# Patient Record
Sex: MALE | Race: WHITE
[De-identification: names, ages, dates, MRNs, and addresses within clinical notes are randomized per-mention and may not be internally consistent; named-entity substitution may affect disease eponyms.]

---

## 2018-02-05 ENCOUNTER — HOSPITAL ENCOUNTER (OUTPATIENT)
Dept: HOSPITAL 80 - FED | Age: 23
Setting detail: OBSERVATION
LOS: 1 days | Discharge: HOME | End: 2018-02-06
Attending: INTERNAL MEDICINE | Admitting: INTERNAL MEDICINE
Payer: COMMERCIAL

## 2018-02-05 DIAGNOSIS — Z83.49: ICD-10-CM

## 2018-02-05 DIAGNOSIS — B33.23: Primary | ICD-10-CM

## 2018-02-05 LAB — PLATELET # BLD: 320 10^3/UL (ref 150–400)

## 2018-02-05 PROCEDURE — 71046 X-RAY EXAM CHEST 2 VIEWS: CPT

## 2018-02-05 PROCEDURE — 93308 TTE F-UP OR LMTD: CPT

## 2018-02-05 PROCEDURE — 93005 ELECTROCARDIOGRAM TRACING: CPT

## 2018-02-05 PROCEDURE — G0378 HOSPITAL OBSERVATION PER HR: HCPCS

## 2018-02-05 RX ADMIN — KETOROLAC TROMETHAMINE SCH MG: 15 INJECTION, SOLUTION INTRAMUSCULAR; INTRAVENOUS at 11:18

## 2018-02-05 RX ADMIN — KETOROLAC TROMETHAMINE SCH MG: 15 INJECTION, SOLUTION INTRAMUSCULAR; INTRAVENOUS at 17:44

## 2018-02-05 RX ADMIN — COLCHICINE SCH MG: 0.6 CAPSULE ORAL at 12:34

## 2018-02-05 RX ADMIN — COLCHICINE SCH MG: 0.6 CAPSULE ORAL at 21:42

## 2018-02-05 NOTE — GHP
[f rep st]



                                                            HISTORY AND PHYSICAL





DATE OF ADMISSION:  02/05/2018



CHIEF COMPLAINT:  Chest pain.



HISTORY:  The patient is a 23-year-old male, who awoke at 2:00 this morning with severe chest pain.  
He describes it as a central pleuritic sharp chest pain 8/10 in severity at its worst.  Currently 5/1
0 after receiving Toradol.  It gets very severe when he lies on his back and does get better when he 
sits upright leaning forward.  He recently had a sinusitis infection and does describe frequent bebo
nued upper respiratory viral infections; it is not unusual for him.  He also has frequent cold sweats
 at night which are somewhat chronic.  He has recently lost over 40 pounds, but he attributes it to a
 healthy [place] lifestyle since he started college here.



PAST MEDICAL HISTORY:  Negative.



MEDICATIONS:  None.



ALLERGIES:  No known drug allergies.



SOCIAL HISTORY:  No smoking.  Drinks alcohol once a week.  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
 house with 3 roommates, originally from [place] where his parents still live.



REVIEW OF SYSTEMS:  Complete review of systems obtained.  Review of systems negative regarding consti
tutional, HEENT, GI, pulmonary vascular , hematology, skin, musculoskeletal, endocrine, psych excep
t for positives and negatives as noted in HPI.



FAMILY HISTORY:  Mom has hypothyroidism.



PHYSICAL EXAMINATION:  GENERAL:  Well-developed, well-nourished male, in no acute distress.  VITAL SI
GNS:  Temperature 36.5, pulse 91, blood pressure 114/70, saturating 99% on room air.  EYES:  Normal c
onjunctivae.  Pupils equal and react to light.  ENT:  Normal ears, nose.  Hearing intact.  Normal shania
th.  Oropharynx moist.  NECK:  Trachea midline.  No thyromegaly.  CHEST:  Normal respiratory effort. 
 LUNGS:  Clear to auscultation bilaterally.  CARDIOVASCULAR:  Regular rhythm.  No murmur.  No extremi
ty edema.  ABDOMEN:  Soft, nontender.  No hepatosplenomegaly.  SKIN:  Warm, dry, intact.  No rash.  M
USCULOSKELETAL:  No cyanosis or clubbing.  Strength 5/5 upper and lower extremities.  NEUROLOGIC:  Cr
anial nerves intact.  Normal sensation to light touch.  PSYCH:  Alert and oriented x3.  Normal affect
.  Normal judgment.  Normal memory.



LABORATORY DATA:  White count 20.23, hematocrit 43.7, platelets 320.  Sodium 142, potassium 4.3, chlo
ride 100, bicarb 21, BUN 15, creatinine 0.9, glucose 110.  Troponins negative. D-dimer is negative.  
Chest x-ray is negative.  EKG viewed by me.  My personal interpretation is normal sinus rhythm.  He h
as diffuse ST elevations throughout all leads. 



This case was discussed with both Dr. Lindquist in the emergency room as well as FRANCOIS Cruz for Ca
rdiology regarding acute pericarditis diagnosis and plan of care.



ASSESSMENT/PLAN:  

1.  Acute pericarditis.  I suspect this is viral.  Will also check an HIV.  He will be treated with N
SAIDs and colchicine.  Will follow serial EKGs and repeat echo as needed. 

2.  Leukocytosis.  I suspect this is inflammatory due to his pericarditis.  Will recheck in the [facility].

3.  40 pound weight loss.  Will check a TSH, although this may be due to lifestyle alterations.



CODE STATUS:  Full.



ADMISSION STATUS:  

1.  Will admit to observation.  We will re-evaluate tomorrow regarding ongoing need for hospitalizati
on.

2.  DVT prophylaxis.  He is low risk.  I do not think the risk of converting to a hemorrhagic pericar
ditis is worth the benefit of a pharmacologic DVT prophylaxis.





Job #:  [id number]

## 2018-02-05 NOTE — CPEKG
Heart Rate: 75

RR Interval: 800

P-R Interval: 144

QRSD Interval: 74

QT Interval: 376

QTC Interval: 420

P Axis: 70

QRS Axis: 75

T Wave Axis: 74

EKG Severity - NORMAL ECG -

EKG Impression: SINUS RHYTHM

EKG Impression: ST ELEV, PROBABLE NORMAL EARLY REPOL PATTERN

Electronically Signed By: Janice Ortiz 06-Feb-2018 06:03:19

## 2018-02-05 NOTE — GCON
[f rep st]



                                                                    CONSULTATION





CARDIOLOGY CONSULTATION.



DATE OF CONSULTATION:  02/05/2018





CHIEF COMPLAINT:  Chest pain.



HISTORY OF PRESENT ILLNESS:  The patient is a 23-year-old male who presented to the hospital with kwaku
den onset of chest pain which woke him from his sleep at 2 a.m.  His discomfort has been progressive 
since that time.  It is worse with lying down and when he is up moving around.  His initial EKG was u
nremarkable.  His followup EKG does suggest acute pericarditis.  He admits to a sinus infection appro
ximately 2 months ago.  His troponins have been negative x3.  An echocardiogram revealed preserved LV
 function with EF of 65% to 70%.  There is a trivial pericardial effusion.



PAST MEDICAL HISTORY:  None.



PAST SURGICAL HISTORY:  None.



FAMILY HISTORY:  Negative for coronary artery disease.



SOCIAL HISTORY:  He is currently a student at .  He denies any drug use.  He does smoke occasionall
y, but denies any recent tobacco use.  He is exercising regularly, hiking with his dogs without chest
 discomfort.



HOME MEDICATIONS:  None.



ALLERGIES:  No known drug allergies.



REVIEW OF SYSTEMS:  Negative except for what is stated in the H and P.



PHYSICAL EXAMINATION:  GENERAL:  Patient appears in no acute distress. 

VITAL SIGNS:  Blood pressure 131/71, heart rate 92, oxygen saturation of 96% on room air, afebrile. 

NECK:  No carotid bruits or JVD present. 

LUNGS:  Clear to auscultation.  No wheezes, rhonchi, or crackles auscultated.  CARDIAC:  Regular rate
 and rhythm with mild rub present.  

ABDOMEN:  Soft, nontender, nondistended. 

EXTREMITIES:  Palpable pulses bilaterally without any evidence of edema.  NEUROLOGIC:  Nonfocal. 

PSYCHIATRIC:  Mood and affect appropriate. 

SKIN:  No obvious rashes or ecchymosis identified.



LABORATORY DATA:  BMP within normal limits except for carbon dioxide is slightly low at 21.  Troponin
 negative x3.  D-dimer negative.  WBCs 20.23, hemoglobin 15.5, hematocrit 43.7, platelets 312.



DIAGNOSTIC TESTS:  Echocardiogram reveals preserved LV function with ejection fraction of 65% to 70%.
  There is a trivial pericardial effusion.  His EKG shows diffuse ST elevation with ST depression in 
AVR suggesting pericarditis.



ASSESSMENT:  The patient is a 23-year-old male who presents with acute pericarditis.



PLAN:  Terence woke with chest pain this morning and the symptoms and EKG are consistent with acute peric
arditis.  An echocardiogram shows preserved LV function with a trivial pericardial effusion.  We will
 plan for pain control with Toradol and narcotics p.r.n. if needed.  He will be started on colchicine
 0.6 mg twice daily as well. 



He has a family history of thyroid disorders.  He has noted about 40 pound weight loss since moving t
[facility].  He does admit to being a lot more active than he was in the past.  It is possible his we
ight loss is secondary to exercise, but we will check a TSH as well.





Job #:  [id number]

## 2018-02-05 NOTE — CPEKG
Heart Rate: 85

RR Interval: 706

P-R Interval: 148

QRSD Interval: 76

QT Interval: 344

QTC Interval: 409

P Axis: 72

QRS Axis: 68

T Wave Axis: 72

EKG Severity - NORMAL ECG -

EKG Impression: SINUS RHYTHM

Electronically Signed By: Carolynn Lindquist 05-Feb-2018 15:27:18

## 2018-02-05 NOTE — EDPHY
H & P


Stated Complaint: SOB, chest pain since 2am


Source: Patient


Exam Limitations: No limitations





- Personal History


Current Tetanus/Diphtheria Vaccine: Unsure





- Medical/Surgical History


Hx Asthma: No


Hx Chronic Respiratory Disease: No


Hx Diabetes: No


Hx Cardiac Disease: No


Hx Renal Disease: No


Hx Cirrhosis: No


Hx Alcoholism: No


Hx HIV/AIDS: No


Hx Splenectomy or Spleen Trauma: No


Other PMH: denies





- Social History


Smoking Status: Former smoker


Time Seen by Provider: 02/05/18 05:45


HPI/ROS: 





HPI


The patient presents with chest pain and shortness of breath which began at 2:

00 a.m. this morning.  It awoke him from sleep.  He describes a sharp pain in 

his anterior chest and back which is worse when he takes a deep breath or 

coughs.  It is also worse when he lies down flat.  It is been constant and 

started suddenly.  He has not had any cough or fever and generally been feeling 

well lately.  He went skiing 2 days ago and did fall, though does not recall 

injuring his chest.  He does not have any leg swelling, recent travel, personal 

or family history of DVT/PE.





REVIEW OF SYSTEMS


Constitutional:  No fever, no chills.


Eyes:  No discharge.


ENT:  No sore throat.


Cardiovascular:  Positive for chest pain, no palpitations.


Respiratory:  No cough, positive for shortness of breath.


Gastrointestinal:  No abdominal pain, no vomiting.


Genitourinary:  No hematuria.


Musculoskeletal:  No back pain.


Skin:  No rashes.


Neurological:  No headache.





PMHx:  Healthy





Soc Hx:  [employer] student











PHYSICAL


General Appearance: Alert, no distress


Eyes: Pupils equal and round no pallor or injection


ENT, Mouth: Mucous membranes moist


Respiratory: There are no retractions, lungs are clear to auscultation


Chest wall:  Tender throughout his sternum


Cardiovascular:  Regular rate and rhythm 


Gastrointestinal:  Abdomen is soft and non-tender, no masses, bowel sounds 

normal 


Neurological:  A&O, moves all extremities


Skin:  Warm and dry, no rashes


Musculoskeletal: Neck is supple non tender 


Extremities:  symmetrical, full range of motion 


Psychiatric:  Patient is oriented X 3, there is no agitation 


 (Riguzzi,Janice)


Constitutional: 


 Initial Vital Signs











Temperature (C)  36.5 C   02/05/18 04:57


 


Heart Rate  96   02/05/18 04:57


 


Respiratory Rate  20   02/05/18 04:57


 


Blood Pressure  131/71 H  02/05/18 04:57


 


O2 Sat (%)  99   02/05/18 04:57








 











O2 Delivery Mode               Room Air














Allergies/Adverse Reactions: 


 





No Known Allergies Allergy (Unverified 02/05/18 05:01)


 








Home Medications: 














 Medication  Instructions  Recorded


 


Naproxen [Naprosyn] 500 mg PO BID #30 tablet 02/05/18














Medical Decision Making





- Diagnostics


EKG Interpretation: 





EKG:  Complete interpretation has been separately recorded in the [facility] 

archive.  Summary impression:  Slight ST segment elevation in all leads


 (Janice Ortiz)


Imaging Results: 


Chest x-ray two view shows no cardiomegaly, no effusion, interpreted me, 

radiology interpretation is pending. (Janice Ortiz)


ED Course/Re-evaluation: 





745:  The patient is signed out to me at change of shift by Dr. Ortiz.  

Patient has a cardiac echo pending.  I reviewed the documentation by Dr. hernández as well as her laboratory studies.  It is noted patient's white count was 

elevated 20,000. His D-dimer is normal. I went and personally evaluated the 

patient.  Patient still has mild discomfort when he takes a deep breath or 

moves.  No shortness of breath or chest pain at rest.





I reviewed the echo images at bedside.





It is noted based on the pulmonary embolism diagnostic pathway that the patient 

fell into the intermediate risk category.  D-dimer was negative. I do not feel 

he needs CT angiogram imaging at this time. (Carolynn Lindquist)


Differential Diagnosis: 





23-year-old healthy male presents with several hours of acute onset of chest 

pain which is pleuritic in nature.  On exam, he is slightly tachycardic, other 

vital signs are normal, he does have sternal tenderness.





Differential diagnosis includes pericarditis, costochondritis, PE, pneumonia, 

rib fracture, pulmonary contusion.





In the emergency department, patient had been ibuprofen for pain with little 

improvement.





Labs were checked and did reveal a profound leukocytosis of 20,000 with 

increased neutrophilic predominance.  Otherwise D-dimer and troponin were 

negative.  EKG did show slight ST segment elevation raising suspicion for 

myocarditis versus pericarditis.





Because of patient's pain and leukocytosis I have recommended admission.  

However he would like to go home.  Is concerned about cost of inpatient 

hospitalization because he is a college student working on [employer] in [employer].





We have decided to echocardiogram in the emergency department and if normal, he 

is amenable to admission, otherwise he would really like to go home.  I feel he 

can return if he is worse that is reasonable.  I will give him Cardiology follow

-up.





 (Janice Ortiz)





- Data Points


Laboratory Results: 


 Laboratory Results





 02/05/18 06:02 





 02/05/18 06:02 








Medications Given: 


 





Colchicine (Colchicine)  0.6 mg PO BID ALXE


   Stop: 08/04/18 11:59


   Last Admin: 02/05/18 21:42 Dose:  0.6 mg


Ketorolac Tromethamine (Toradol)  15 mg IVP Q6HRS ALEX


   Stop: 02/10/18 11:59


   Last Admin: 02/06/18 01:13 Dose:  15 mg





Discontinued Medications





Ibuprofen (Motrin)  400 mg PO EDNOW ONE


   Stop: 02/05/18 05:53


   Last Admin: 02/05/18 05:57 Dose:  400 mg








Departure





- Departure


Disposition: [facility]


Clinical Impression: 


Pericarditis


Qualifiers:


 Pericarditis type: unspecified type Chronicity: acute Qualified Code(s): I30.9 

- Acute pericarditis, unspecified





Condition: Good

## 2018-02-05 NOTE — ECHO
[email or web address]





[address] [address] 

Main: [phone] 



Fax: 



Transthoracic Echocardiogram 

Name:             ESSENCE ROE                       MR#:

[id number]

Study Date:       2018                            Study Time:

07:45 AM

YOB: 1995                            Age:

23 year(s)

Height:           177.8 cm (70 in.)                     Weight:

68.04 kg (150 lb.)

BSA:              1.85 m2                               Gender:

Male

Examination:      Echo                                  Indication:

pleuritic chest pain(fell skiing)



leukocytosis/Question myocarditis 

Image Quality:                                          Contrast: 

Requested by:     Janice Ortiz                     BP:

114 mmHg/70 mmHg

Heart Rate:                                             Rhythm: 

Indication:       pleuritic chest pain(fell skiing)

leukocytosis/Question myocarditis



Procedure Staff 

Ultrasound Technician:   Zee Mayer [facility] 

Reading Physician:       Inderjit Ibarra 

Requesting Provider: 



Conclusions:          Normal size left ventricle.  

Normal global systolic LV function.  

The ejection fraction is estimated to be 65-70 %.  

No regional wall motion abnormality.  

Trivial mitral valve regurgitation.  

Trivial tricuspid valve regurgitation.  

Trivial pericardial effusion. 



Measurements: 

Chambers                    Valvular Assessment AV/MV

Valvular Assessment TV/PV



Normal                                   Normal

Normal

Name         Value     Range              Name         Value Range

Name          Value Range

IVSd (2D):   0.6 cm (0.6 cm-1.1               AV Vmax:     1.21 m/s (1

m/s-1.7



cm)                                  m/s)  

LVDd (2D):   4.8 cm    (4.2 cm-5.9            AV maxP mmHg ( -

)



cm)                AV meanPG:   3 mmHg ( - )  

LVDs (2D):   3.0 cm    (2.1 cm-4              MV E Vmax:   0.80 m/s (

- )



cm)                MV A Vmax:   0.55 m/s ( - )  

LVPWd (2D):  0.9 cm    (0.6 cm-1              MV E/A:      1.45 ( - )





cm)   

EF Range:    65-70 % 



Continued Measurements: 

Chambers                    Valvular Assessment AV/MV  



Name                     Value            Name

Value

LADs:                  3.3 cm                 MV E/E' Septal:

7.80

LADs Lon.7 cm                 MV E/E' Lateral:

4.00

LA Area:               15.0 cm2   



Patient: ESSENCE ROE                     MRN: [id number]

Study Date: 2018   Page 1 of 2

07:45 AM 









Findings: Left Ventricle: 

Normal size left ventricle. No LV hypertrophy. Normal global systolic

LV function. The ejection

fraction is estimated to be 65-70 %. No regional wall motion

abnormality.

Right Ventricle: 

Normal size right ventricle.  

Left Atrium: 

The left atrium is normal in size.  

Right Atrium: 

The right atrium is normal in size.  

Mitral Valve: 

The mitral valve is normal in appearance and function. Trivial mitral

valve regurgitation.

Aortic Valve: 

The aortic valve is normal in appearance and function.  

Tricuspid Valve: 

The tricuspid valve is normal in appearance and function. Trivial

tricuspid valve regurgitation.

Pulmonic Valve: 

The pulmonic valve is normal in appearance and function.  

Aorta: 

The aorta is normal.  

Pericardium: 

Trivial pericardial effusion. 







Electronically signed by Inderjit Ibarra on 2018 at 08:34 AM 

(No Signature Object) 



Patient: ESSENCE REO                     MRN: [id number]

Study Date: 2018   Page 2 of 2

07:45 AM 







[id number]

## 2018-02-06 VITALS
DIASTOLIC BLOOD PRESSURE: 66 MMHG | HEART RATE: 68 BPM | OXYGEN SATURATION: 97 % | TEMPERATURE: 99.1 F | RESPIRATION RATE: 14 BRPM | SYSTOLIC BLOOD PRESSURE: 124 MMHG

## 2018-02-06 LAB — PLATELET # BLD: 295 10^3/UL (ref 150–400)

## 2018-02-06 RX ADMIN — KETOROLAC TROMETHAMINE SCH MG: 15 INJECTION, SOLUTION INTRAMUSCULAR; INTRAVENOUS at 06:58

## 2018-02-06 RX ADMIN — KETOROLAC TROMETHAMINE SCH MG: 15 INJECTION, SOLUTION INTRAMUSCULAR; INTRAVENOUS at 01:13

## 2018-02-06 RX ADMIN — COLCHICINE SCH MG: 0.6 CAPSULE ORAL at 07:35

## 2018-02-06 NOTE — PDCARPN
Cardiology Progress Note


Chief Complaint: 


Chest pressure


Assessment/Plan: 


Assessment/Plan:





The patient is a 24 y/o M admitted with acute pericarditis.  He was started on 

Toradol and his chest discomfort has significantly improved.  He continues to 

complain of 3/10 chest pressure.  Troponins have been negative and a echo 

showed preserved LV function with trace pericardial effusion.  WBC is down from 

20 to 10.  BCX pending.  Will d/c Toradol and begin Ibuprofen 800mg TID to take 

with food.  He will continue Colchicine to reduce longevity and recurrence of 

pericarditis.  I have also started Protonix for GI prophylaxis.  If his chest 

discomfort is well managed with PO Ibuprofen he can be d/c home this afternoon.

  He is scheduled to follow up on 2/13 at 11:15 with Mary at [facility]. He 

is not to do any physical activity until his follow up visit.











02/06/18 09:12





Subjective: 





Sharp CP has resolved.  He continues to complain of 3/10 chest pressure.  He 

denies any SOB.


Objective: 





 Vital Signs (8 Hrs)











  Temp Pulse Resp BP Pulse Ox


 


 02/06/18 07:28  37.2 C  73  18  134/73 H  95


 


 02/06/18 04:00  36.8 C  77  17  123/80 H  97








 Intake/Output (24 Hrs)











 02/05/18 02/06/18 02/07/18





 05:59 05:59 05:59


 


Intake Total  2300 


 


Balance  2300 


 


Intake:   


 


  Oral (ml)  2300 


 


Other:   


 


  Weight  68.039 kg 


 


  Intake Quantity  Yes 





  Sufficient   


 


  Number of Voids  2 


 


    Toilet  2 








tele-NSR


Result Diagrams: 


 02/06/18 04:00





 02/06/18 04:00


Cardiac Labs: 





 Cardiac Lab Results (72 Hrs)











  02/05/18 02/05/18 02/05/18





  18:00 11:39 10:55


 


Troponin I  < 0.012  < 0.012  < 0.012














- Physical Exam


Constitutional: WDWN


Cardiovascular: regular rate and rhythm, other (mild rub present)


Respiratory: clear to auscultate bilat, no crackles, no wheezes


Neurologic: AAOx3





ICD10 Worksheet


Patient Problems: 


 Problems











Problem Status Onset


 


Pericarditis Acute

## 2018-02-06 NOTE — CPEKG
Heart Rate: 66

RR Interval: 909

P-R Interval: 112

QRSD Interval: 92

QT Interval: 372

QTC Interval: 390

P Axis: 54

QRS Axis: 82

T Wave Axis: 69

EKG Severity - NORMAL ECG -

EKG Impression: SINUS RHYTHM

EKG Impression: ST ELEV, PROBABLE NORMAL EARLY REPOL PATTERN, PERICARDITIS

Electronically Signed By: Inderjit Ibarra 06-Feb-2018 15:48:25

## 2018-02-07 LAB — HIV TYPE 1 AND 2: NEGATIVE

## 2018-02-07 NOTE — GDS
[f rep st]



                                                             DISCHARGE SUMMARY





DISCHARGE DIAGNOSIS:  Acute viral pericarditis.



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  T
hroughout his hospitalization, he developed evolving EKG changes, including diffuse ST elevation cons
istent with a pericarditis.  Cardiology saw him in consultation.  We started him on scheduled NSAIDs 
as well as colchicine.  With this intervention, his pain improved and he was ready for discharge home
.  He has outpatient followup scheduled with Cardiology for ongoing management, but he will continue 
on scheduled NSAIDs and colchicine until that time.  He was counseled regarding warning signs for dev
elopment of possible pericardial effusion.  Cardiology recommended GI prophylaxis while on scheduled 
NSAIDs.



DISCHARGE MEDICATIONS:  Please see computerized record for full detailed list.  New medications:

1.  Colchicine 0.6 mg p.o. b.i.d.

2.  Motrin 800 mg p.o. t.i.d.

3.  Protonix 40 mg p.o. daily.



ADDITIONAL DISCHARGE INSTRUCTIONS:  

1.  Followup scheduled at [facility] with FRANCOIS Hernandez, on February 13th at 11:15.

2.  No physical activity until followup with [facility]. 

Patient was seen and examined by me on the day of discharge.





Job #:  [id number]